# Patient Record
Sex: FEMALE | Race: WHITE | Employment: OTHER | URBAN - METROPOLITAN AREA
[De-identification: names, ages, dates, MRNs, and addresses within clinical notes are randomized per-mention and may not be internally consistent; named-entity substitution may affect disease eponyms.]

---

## 2019-01-29 ENCOUNTER — HOSPITAL ENCOUNTER (OUTPATIENT)
Age: 59
Discharge: HOME | End: 2019-01-29
Payer: COMMERCIAL

## 2019-01-29 ENCOUNTER — HOSPITAL ENCOUNTER (OUTPATIENT)
Dept: WOMENS IMAGING | Age: 59
Discharge: HOME | End: 2019-01-29
Payer: COMMERCIAL

## 2019-01-29 ENCOUNTER — HOSPITAL ENCOUNTER (OUTPATIENT)
Dept: GENERAL RADIOLOGY | Age: 59
Discharge: HOME | End: 2019-01-29
Payer: COMMERCIAL

## 2019-01-29 DIAGNOSIS — Z12.31 VISIT FOR SCREENING MAMMOGRAM: ICD-10-CM

## 2019-01-29 DIAGNOSIS — J44.9 OBSTRUCTIVE CHRONIC BRONCHITIS WITHOUT EXACERBATION (HCC): ICD-10-CM

## 2019-01-29 LAB
BASOPHILS ABSOLUTE: 0 K/CU MM
BASOPHILS RELATIVE PERCENT: 0.4 % (ref 0–1)
DIFFERENTIAL TYPE: ABNORMAL
EOSINOPHILS ABSOLUTE: 0.1 K/CU MM
EOSINOPHILS RELATIVE PERCENT: 1.6 % (ref 0–3)
HCT VFR BLD CALC: 44.7 % (ref 37–47)
HEMOGLOBIN: 14.1 GM/DL (ref 12.5–16)
IMMATURE NEUTROPHIL %: 0.3 % (ref 0–0.43)
LYMPHOCYTES ABSOLUTE: 1.9 K/CU MM
LYMPHOCYTES RELATIVE PERCENT: 24.5 % (ref 24–44)
MCH RBC QN AUTO: 33.3 PG (ref 27–31)
MCHC RBC AUTO-ENTMCNC: 31.5 % (ref 32–36)
MCV RBC AUTO: 105.4 FL (ref 78–100)
MONOCYTES ABSOLUTE: 0.3 K/CU MM
MONOCYTES RELATIVE PERCENT: 3.4 % (ref 0–4)
NUCLEATED RBC %: 0.3 %
PDW BLD-RTO: 13.1 % (ref 11.7–14.9)
PLATELET # BLD: 126 K/CU MM (ref 140–440)
RBC # BLD: 4.24 M/CU MM (ref 4.2–5.4)
REASON FOR REJECTION: NORMAL
REJECTED TEST: NORMAL
SEGMENTED NEUTROPHILS ABSOLUTE COUNT: 5.3 K/CU MM
SEGMENTED NEUTROPHILS RELATIVE PERCENT: 69.8 % (ref 36–66)
SOURCE: NORMAL
TOTAL IMMATURE NEUTOROPHIL: 0.02 K/CU MM
TOTAL NUCLEATED RBC: 0 K/CU MM
WBC # BLD: 7.6 K/CU MM (ref 4–10.5)

## 2019-01-29 PROCEDURE — 85025 COMPLETE CBC W/AUTO DIFF WBC: CPT

## 2019-01-29 PROCEDURE — 71046 X-RAY EXAM CHEST 2 VIEWS: CPT

## 2019-01-29 PROCEDURE — 36415 COLL VENOUS BLD VENIPUNCTURE: CPT

## 2019-01-29 PROCEDURE — 77067 SCR MAMMO BI INCL CAD: CPT

## 2019-01-31 ENCOUNTER — HOSPITAL ENCOUNTER (OUTPATIENT)
Age: 59
Discharge: HOME | End: 2019-01-31
Payer: COMMERCIAL

## 2019-01-31 LAB
ALBUMIN SERPL-MCNC: 4.1 GM/DL (ref 3.4–5)
ALP BLD-CCNC: 67 IU/L (ref 40–128)
ALT SERPL-CCNC: 24 U/L (ref 10–40)
ANION GAP SERPL CALCULATED.3IONS-SCNC: 14 MMOL/L (ref 4–16)
AST SERPL-CCNC: 27 IU/L (ref 15–37)
BACTERIA: ABNORMAL /HPF
BASOPHILS ABSOLUTE: 0 K/CU MM
BASOPHILS RELATIVE PERCENT: 0.5 % (ref 0–1)
BILIRUB SERPL-MCNC: 0.3 MG/DL (ref 0–1)
BILIRUBIN URINE: NEGATIVE MG/DL
BLOOD, URINE: NEGATIVE
BUN BLDV-MCNC: 19 MG/DL (ref 6–23)
CALCIUM SERPL-MCNC: 9 MG/DL (ref 8.3–10.6)
CHLORIDE BLD-SCNC: 96 MMOL/L (ref 99–110)
CHOLESTEROL: 138 MG/DL
CLARITY: ABNORMAL
CO2: 26 MMOL/L (ref 21–32)
COLOR: YELLOW
CREAT SERPL-MCNC: 1 MG/DL (ref 0.6–1.1)
DIFFERENTIAL TYPE: ABNORMAL
EOSINOPHILS ABSOLUTE: 0.4 K/CU MM
EOSINOPHILS RELATIVE PERCENT: 4.9 % (ref 0–3)
GFR AFRICAN AMERICAN: >60 ML/MIN/1.73M2
GFR NON-AFRICAN AMERICAN: 57 ML/MIN/1.73M2
GLUCOSE BLD-MCNC: 169 MG/DL (ref 70–99)
GLUCOSE, URINE: NEGATIVE MG/DL
HCT VFR BLD CALC: 41.1 % (ref 37–47)
HDLC SERPL-MCNC: 76 MG/DL
HEMOGLOBIN: 13.1 GM/DL (ref 12.5–16)
HEPATITIS C ANTIBODY: ABNORMAL
IMMATURE NEUTROPHIL %: 0.2 % (ref 0–0.43)
KETONES, URINE: NEGATIVE MG/DL
LDL CHOLESTEROL DIRECT: 59 MG/DL
LEUKOCYTE ESTERASE, URINE: ABNORMAL
LYMPHOCYTES ABSOLUTE: 3 K/CU MM
LYMPHOCYTES RELATIVE PERCENT: 33.8 % (ref 24–44)
MCH RBC QN AUTO: 32.3 PG (ref 27–31)
MCHC RBC AUTO-ENTMCNC: 31.9 % (ref 32–36)
MCV RBC AUTO: 101.2 FL (ref 78–100)
MONOCYTES ABSOLUTE: 0.6 K/CU MM
MONOCYTES RELATIVE PERCENT: 6.6 % (ref 0–4)
NITRITE URINE, QUANTITATIVE: NEGATIVE
NUCLEATED RBC %: 0 %
PDW BLD-RTO: 12.7 % (ref 11.7–14.9)
PH, URINE: 7 (ref 5–8)
PLATELET # BLD: 199 K/CU MM (ref 140–440)
PMV BLD AUTO: 11.1 FL (ref 7.5–11.1)
POTASSIUM SERPL-SCNC: 4.5 MMOL/L (ref 3.5–5.1)
PROTEIN UA: NEGATIVE MG/DL
RBC # BLD: 4.06 M/CU MM (ref 4.2–5.4)
RBC URINE: 4 /HPF (ref 0–6)
SEGMENTED NEUTROPHILS ABSOLUTE COUNT: 4.8 K/CU MM
SEGMENTED NEUTROPHILS RELATIVE PERCENT: 54 % (ref 36–66)
SODIUM BLD-SCNC: 136 MMOL/L (ref 135–145)
SPECIFIC GRAVITY UA: 1.01 (ref 1–1.03)
SQUAMOUS EPITHELIAL: 8 /HPF
T4 FREE: 1.3 NG/DL (ref 0.9–1.8)
TOTAL IMMATURE NEUTOROPHIL: 0.02 K/CU MM
TOTAL NUCLEATED RBC: 0 K/CU MM
TOTAL PROTEIN: 7.2 GM/DL (ref 6.4–8.2)
TRICHOMONAS: ABNORMAL /HPF
TRIGL SERPL-MCNC: 84 MG/DL
TSH HIGH SENSITIVITY: 3.53 UIU/ML (ref 0.27–4.2)
UROBILINOGEN, URINE: NORMAL MG/DL (ref 0.2–1)
WBC # BLD: 8.8 K/CU MM (ref 4–10.5)
WBC UA: 7 /HPF (ref 0–5)

## 2019-01-31 PROCEDURE — 86803 HEPATITIS C AB TEST: CPT

## 2019-01-31 PROCEDURE — 83721 ASSAY OF BLOOD LIPOPROTEIN: CPT

## 2019-01-31 PROCEDURE — 84443 ASSAY THYROID STIM HORMONE: CPT

## 2019-01-31 PROCEDURE — 85025 COMPLETE CBC W/AUTO DIFF WBC: CPT

## 2019-01-31 PROCEDURE — 81001 URINALYSIS AUTO W/SCOPE: CPT

## 2019-01-31 PROCEDURE — 36415 COLL VENOUS BLD VENIPUNCTURE: CPT

## 2019-01-31 PROCEDURE — 84439 ASSAY OF FREE THYROXINE: CPT

## 2019-01-31 PROCEDURE — 80053 COMPREHEN METABOLIC PANEL: CPT

## 2019-01-31 PROCEDURE — 80061 LIPID PANEL: CPT

## 2019-02-14 ENCOUNTER — HOSPITAL ENCOUNTER (OUTPATIENT)
Age: 59
Discharge: HOME | End: 2019-02-14
Payer: COMMERCIAL

## 2019-02-14 LAB
ESTIMATED AVERAGE GLUCOSE: 103 MG/DL
GLUCOSE FASTING: 86 MG/DL (ref 70–99)
HBA1C MFR BLD: 5.2 % (ref 4.2–6.3)

## 2019-02-14 PROCEDURE — 83036 HEMOGLOBIN GLYCOSYLATED A1C: CPT

## 2019-02-14 PROCEDURE — 36415 COLL VENOUS BLD VENIPUNCTURE: CPT

## 2019-02-14 PROCEDURE — 82947 ASSAY GLUCOSE BLOOD QUANT: CPT

## 2019-03-11 ENCOUNTER — HOSPITAL ENCOUNTER (OUTPATIENT)
Dept: PULMONOLOGY | Age: 59
Discharge: HOME | End: 2019-03-11

## 2019-03-11 NOTE — PROGRESS NOTES
Pt says she is still sick after taking a few days of antibiotics. She said she was not able to give her best effort.   She will reschedule test.

## 2021-11-15 ENCOUNTER — HOSPITAL ENCOUNTER (OUTPATIENT)
Dept: GENERAL RADIOLOGY | Age: 61
Discharge: HOME | End: 2021-11-15
Payer: COMMERCIAL

## 2021-11-15 ENCOUNTER — HOSPITAL ENCOUNTER (OUTPATIENT)
Age: 61
Discharge: HOME | End: 2021-11-15
Payer: COMMERCIAL

## 2021-11-15 DIAGNOSIS — M54.50 LOW BACK PAIN, UNSPECIFIED BACK PAIN LATERALITY, UNSPECIFIED CHRONICITY, UNSPECIFIED WHETHER SCIATICA PRESENT: ICD-10-CM

## 2021-11-15 DIAGNOSIS — M19.90 SENILE ARTHRITIS: ICD-10-CM

## 2021-11-15 DIAGNOSIS — M54.2 NECK PAIN: ICD-10-CM

## 2021-11-15 DIAGNOSIS — M54.6 THORACIC SPINE PAIN: ICD-10-CM

## 2021-11-15 PROCEDURE — 73560 X-RAY EXAM OF KNEE 1 OR 2: CPT

## 2021-11-15 PROCEDURE — 73030 X-RAY EXAM OF SHOULDER: CPT

## 2021-11-15 PROCEDURE — 72100 X-RAY EXAM L-S SPINE 2/3 VWS: CPT

## 2021-11-15 PROCEDURE — 72072 X-RAY EXAM THORAC SPINE 3VWS: CPT

## 2021-11-15 PROCEDURE — 72040 X-RAY EXAM NECK SPINE 2-3 VW: CPT

## 2021-11-15 PROCEDURE — 93005 ELECTROCARDIOGRAM TRACING: CPT | Performed by: FAMILY MEDICINE

## 2021-11-16 ENCOUNTER — HOSPITAL ENCOUNTER (OUTPATIENT)
Age: 61
Discharge: HOME | End: 2021-11-16
Payer: COMMERCIAL

## 2021-11-16 LAB
ALBUMIN SERPL-MCNC: 4.6 GM/DL (ref 3.4–5)
ALP BLD-CCNC: 60 IU/L (ref 40–128)
ALT SERPL-CCNC: 20 U/L (ref 10–40)
ANION GAP SERPL CALCULATED.3IONS-SCNC: 14 MMOL/L (ref 4–16)
AST SERPL-CCNC: 25 IU/L (ref 15–37)
BILIRUB SERPL-MCNC: 0.4 MG/DL (ref 0–1)
BUN BLDV-MCNC: 16 MG/DL (ref 6–23)
CALCIUM SERPL-MCNC: 9.8 MG/DL (ref 8.3–10.6)
CHLORIDE BLD-SCNC: 100 MMOL/L (ref 99–110)
CHOLESTEROL: 152 MG/DL
CO2: 26 MMOL/L (ref 21–32)
CREAT SERPL-MCNC: 1 MG/DL (ref 0.6–1.1)
ERYTHROCYTE SEDIMENTATION RATE: 5 MM/HR (ref 0–30)
ESTIMATED AVERAGE GLUCOSE: 108 MG/DL
GFR AFRICAN AMERICAN: >60 ML/MIN/1.73M2
GFR NON-AFRICAN AMERICAN: 57 ML/MIN/1.73M2
GLUCOSE BLD-MCNC: 71 MG/DL (ref 70–99)
HBA1C MFR BLD: 5.4 % (ref 4.2–6.3)
HCT VFR BLD CALC: 44 % (ref 37–47)
HDLC SERPL-MCNC: 88 MG/DL
HEMOGLOBIN: 14.7 GM/DL (ref 12.5–16)
LDL CHOLESTEROL DIRECT: 55 MG/DL
MAGNESIUM: 2 MG/DL (ref 1.8–2.4)
MCH RBC QN AUTO: 33.2 PG (ref 27–31)
MCHC RBC AUTO-ENTMCNC: 33.4 % (ref 32–36)
MCV RBC AUTO: 99.3 FL (ref 78–100)
PDW BLD-RTO: 12.2 % (ref 11.7–14.9)
PLATELET # BLD: 172 K/CU MM (ref 140–440)
PMV BLD AUTO: 11.9 FL (ref 7.5–11.1)
POTASSIUM SERPL-SCNC: 4.6 MMOL/L (ref 3.5–5.1)
RBC # BLD: 4.43 M/CU MM (ref 4.2–5.4)
SODIUM BLD-SCNC: 140 MMOL/L (ref 135–145)
T4 FREE: 1.34 NG/DL (ref 0.9–1.8)
TOTAL PROTEIN: 7.3 GM/DL (ref 6.4–8.2)
TRIGL SERPL-MCNC: 73 MG/DL
TSH HIGH SENSITIVITY: 3.77 UIU/ML (ref 0.27–4.2)
URIC ACID: 6.5 MG/DL (ref 2.6–6)
VITAMIN D 25-HYDROXY: 49.94 NG/ML
WBC # BLD: 8.4 K/CU MM (ref 4–10.5)

## 2021-11-16 PROCEDURE — 83721 ASSAY OF BLOOD LIPOPROTEIN: CPT

## 2021-11-16 PROCEDURE — 84443 ASSAY THYROID STIM HORMONE: CPT

## 2021-11-16 PROCEDURE — 82306 VITAMIN D 25 HYDROXY: CPT

## 2021-11-16 PROCEDURE — 83735 ASSAY OF MAGNESIUM: CPT

## 2021-11-16 PROCEDURE — 80061 LIPID PANEL: CPT

## 2021-11-16 PROCEDURE — 84439 ASSAY OF FREE THYROXINE: CPT

## 2021-11-16 PROCEDURE — 85027 COMPLETE CBC AUTOMATED: CPT

## 2021-11-16 PROCEDURE — 84550 ASSAY OF BLOOD/URIC ACID: CPT

## 2021-11-16 PROCEDURE — 85652 RBC SED RATE AUTOMATED: CPT

## 2021-11-16 PROCEDURE — 36415 COLL VENOUS BLD VENIPUNCTURE: CPT

## 2021-11-16 PROCEDURE — 80053 COMPREHEN METABOLIC PANEL: CPT

## 2021-11-16 PROCEDURE — 83036 HEMOGLOBIN GLYCOSYLATED A1C: CPT

## 2021-11-17 LAB
EKG ATRIAL RATE: 80 BPM
EKG DIAGNOSIS: NORMAL
EKG P AXIS: 83 DEGREES
EKG P-R INTERVAL: 150 MS
EKG Q-T INTERVAL: 380 MS
EKG QRS DURATION: 90 MS
EKG QTC CALCULATION (BAZETT): 438 MS
EKG R AXIS: 91 DEGREES
EKG T AXIS: 77 DEGREES
EKG VENTRICULAR RATE: 80 BPM

## 2021-11-17 PROCEDURE — 93010 ELECTROCARDIOGRAM REPORT: CPT | Performed by: INTERNAL MEDICINE

## 2022-02-09 ENCOUNTER — HOSPITAL ENCOUNTER (OUTPATIENT)
Dept: WOMENS IMAGING | Age: 62
Discharge: HOME | End: 2022-02-09
Payer: COMMERCIAL

## 2022-02-09 DIAGNOSIS — M81.0 AGE-RELATED OSTEOPOROSIS WITHOUT CURRENT PATHOLOGICAL FRACTURE: ICD-10-CM

## 2022-02-09 DIAGNOSIS — Z12.31 ENCOUNTER FOR SCREENING MAMMOGRAM FOR MALIGNANT NEOPLASM OF BREAST: ICD-10-CM

## 2022-02-09 PROCEDURE — 77080 DXA BONE DENSITY AXIAL: CPT

## 2022-02-09 PROCEDURE — 77063 BREAST TOMOSYNTHESIS BI: CPT

## 2023-07-17 ENCOUNTER — HOSPITAL ENCOUNTER (OUTPATIENT)
Age: 63
Discharge: HOME | End: 2023-07-17
Payer: COMMERCIAL

## 2023-07-17 LAB
25(OH)D3 SERPL-MCNC: 32.45 NG/ML
ALBUMIN SERPL-MCNC: 4.4 GM/DL (ref 3.4–5)
ALP BLD-CCNC: 84 IU/L (ref 40–129)
ALT SERPL-CCNC: 51 U/L (ref 10–40)
ANION GAP SERPL CALCULATED.3IONS-SCNC: 11 MMOL/L (ref 4–16)
AST SERPL-CCNC: 63 IU/L (ref 15–37)
BILIRUB SERPL-MCNC: 0.5 MG/DL (ref 0–1)
BILIRUBIN DIRECT: 0.2 MG/DL (ref 0–0.3)
BILIRUBIN, INDIRECT: 0.3 MG/DL (ref 0–0.7)
BUN SERPL-MCNC: 14 MG/DL (ref 6–23)
CALCIUM SERPL-MCNC: 9.4 MG/DL (ref 8.3–10.6)
CHLORIDE BLD-SCNC: 103 MMOL/L (ref 99–110)
CHOLEST SERPL-MCNC: 185 MG/DL
CO2: 24 MMOL/L (ref 21–32)
CREAT SERPL-MCNC: 1 MG/DL (ref 0.6–1.1)
ERYTHROCYTE SEDIMENTATION RATE: 4 MM/HR (ref 0–30)
ESTIMATED AVERAGE GLUCOSE: 97 MG/DL
GFR SERPL CREATININE-BSD FRML MDRD: >60 ML/MIN/1.73M2
GLUCOSE SERPL-MCNC: 81 MG/DL (ref 70–99)
HBA1C MFR BLD: 5 % (ref 4.2–6.3)
HCT VFR BLD CALC: 45.7 % (ref 37–47)
HDLC SERPL-MCNC: 62 MG/DL
HEMOGLOBIN: 15.1 GM/DL (ref 12.5–16)
LDLC SERPL CALC-MCNC: 101 MG/DL
MAGNESIUM: 2.3 MG/DL (ref 1.8–2.4)
MCH RBC QN AUTO: 32.3 PG (ref 27–31)
MCHC RBC AUTO-ENTMCNC: 33 % (ref 32–36)
MCV RBC AUTO: 97.9 FL (ref 78–100)
PDW BLD-RTO: 13.1 % (ref 11.7–14.9)
PLATELET # BLD: 187 K/CU MM (ref 140–440)
PMV BLD AUTO: 11.4 FL (ref 7.5–11.1)
POTASSIUM SERPL-SCNC: 4.7 MMOL/L (ref 3.5–5.1)
RBC # BLD: 4.67 M/CU MM (ref 4.2–5.4)
SODIUM BLD-SCNC: 138 MMOL/L (ref 135–145)
T4 FREE SERPL-MCNC: 1.14 NG/DL (ref 0.9–1.8)
TOTAL PROTEIN: 6.9 GM/DL (ref 6.4–8.2)
TRIGL SERPL-MCNC: 109 MG/DL
TSH SERPL DL<=0.005 MIU/L-ACNC: 4.56 UIU/ML (ref 0.27–4.2)
URATE SERPL-MCNC: 6.3 MG/DL (ref 2.6–6)
WBC # BLD: 6.4 K/CU MM (ref 4–10.5)

## 2023-07-17 PROCEDURE — 36415 COLL VENOUS BLD VENIPUNCTURE: CPT

## 2023-07-17 PROCEDURE — 82306 VITAMIN D 25 HYDROXY: CPT

## 2023-07-17 PROCEDURE — 84443 ASSAY THYROID STIM HORMONE: CPT

## 2023-07-17 PROCEDURE — 85652 RBC SED RATE AUTOMATED: CPT

## 2023-07-17 PROCEDURE — 80061 LIPID PANEL: CPT

## 2023-07-17 PROCEDURE — 80053 COMPREHEN METABOLIC PANEL: CPT

## 2023-07-17 PROCEDURE — 82248 BILIRUBIN DIRECT: CPT

## 2023-07-17 PROCEDURE — 83036 HEMOGLOBIN GLYCOSYLATED A1C: CPT

## 2023-07-17 PROCEDURE — 84439 ASSAY OF FREE THYROXINE: CPT

## 2023-07-17 PROCEDURE — 83735 ASSAY OF MAGNESIUM: CPT

## 2023-07-17 PROCEDURE — 84550 ASSAY OF BLOOD/URIC ACID: CPT

## 2023-07-17 PROCEDURE — 85027 COMPLETE CBC AUTOMATED: CPT

## 2023-12-29 ENCOUNTER — HOSPITAL ENCOUNTER (EMERGENCY)
Age: 63
Discharge: HOME | End: 2023-12-29
Attending: EMERGENCY MEDICINE
Payer: COMMERCIAL

## 2023-12-29 VITALS
TEMPERATURE: 98.4 F | OXYGEN SATURATION: 97 % | RESPIRATION RATE: 13 BRPM | DIASTOLIC BLOOD PRESSURE: 68 MMHG | SYSTOLIC BLOOD PRESSURE: 106 MMHG | HEART RATE: 60 BPM

## 2023-12-29 DIAGNOSIS — R41.0 DELIRIUM: Primary | ICD-10-CM

## 2023-12-29 LAB
ALBUMIN SERPL-MCNC: 3.6 GM/DL (ref 3.4–5)
ALP BLD-CCNC: 76 IU/L (ref 40–129)
ALT SERPL-CCNC: 20 U/L (ref 10–40)
ANION GAP SERPL CALCULATED.3IONS-SCNC: 7 MMOL/L (ref 7–16)
AST SERPL-CCNC: 22 IU/L (ref 15–37)
BASOPHILS ABSOLUTE: 0 K/CU MM
BASOPHILS RELATIVE PERCENT: 0.7 % (ref 0–1)
BILIRUB SERPL-MCNC: 0.2 MG/DL (ref 0–1)
BUN SERPL-MCNC: 20 MG/DL (ref 6–23)
CALCIUM SERPL-MCNC: 8.3 MG/DL (ref 8.3–10.6)
CHLORIDE BLD-SCNC: 106 MMOL/L (ref 99–110)
CO2: 26 MMOL/L (ref 21–32)
CREAT SERPL-MCNC: 0.9 MG/DL (ref 0.6–1.1)
DIFFERENTIAL TYPE: ABNORMAL
EOSINOPHILS ABSOLUTE: 0.2 K/CU MM
EOSINOPHILS RELATIVE PERCENT: 3 % (ref 0–3)
GFR SERPL CREATININE-BSD FRML MDRD: >60 ML/MIN/1.73M2
GLUCOSE SERPL-MCNC: 103 MG/DL (ref 70–99)
HCT VFR BLD CALC: 35.5 % (ref 37–47)
HEMOGLOBIN: 11.4 GM/DL (ref 12.5–16)
IMMATURE NEUTROPHIL %: 0.2 % (ref 0–0.43)
LYMPHOCYTES ABSOLUTE: 2.9 K/CU MM
LYMPHOCYTES RELATIVE PERCENT: 51.1 % (ref 24–44)
MCH RBC QN AUTO: 32.3 PG (ref 27–31)
MCHC RBC AUTO-ENTMCNC: 32.1 % (ref 32–36)
MCV RBC AUTO: 100.6 FL (ref 78–100)
MONOCYTES ABSOLUTE: 0.3 K/CU MM
MONOCYTES RELATIVE PERCENT: 6 % (ref 0–4)
NUCLEATED RBC %: 0 %
PDW BLD-RTO: 13.9 % (ref 11.7–14.9)
PLATELET # BLD: 141 K/CU MM (ref 140–440)
PMV BLD AUTO: 10.9 FL (ref 7.5–11.1)
POTASSIUM SERPL-SCNC: 4.4 MMOL/L (ref 3.5–5.1)
RBC # BLD: 3.53 M/CU MM (ref 4.2–5.4)
SEGMENTED NEUTROPHILS ABSOLUTE COUNT: 2.2 K/CU MM
SEGMENTED NEUTROPHILS RELATIVE PERCENT: 39 % (ref 36–66)
SODIUM BLD-SCNC: 139 MMOL/L (ref 135–145)
TOTAL IMMATURE NEUTOROPHIL: 0.01 K/CU MM
TOTAL NUCLEATED RBC: 0 K/CU MM
TOTAL PROTEIN: 6 GM/DL (ref 6.4–8.2)
WBC # BLD: 5.7 K/CU MM (ref 4–10.5)

## 2023-12-29 PROCEDURE — 6360000002 HC RX W HCPCS

## 2023-12-29 PROCEDURE — 93005 ELECTROCARDIOGRAM TRACING: CPT | Performed by: EMERGENCY MEDICINE

## 2023-12-29 PROCEDURE — 2580000003 HC RX 258: Performed by: EMERGENCY MEDICINE

## 2023-12-29 PROCEDURE — 99284 EMERGENCY DEPT VISIT MOD MDM: CPT

## 2023-12-29 PROCEDURE — 85025 COMPLETE CBC W/AUTO DIFF WBC: CPT

## 2023-12-29 PROCEDURE — 80053 COMPREHEN METABOLIC PANEL: CPT

## 2023-12-29 RX ORDER — NALOXONE HYDROCHLORIDE 1 MG/ML
INJECTION INTRAMUSCULAR; INTRAVENOUS; SUBCUTANEOUS
Status: COMPLETED
Start: 2023-12-29 | End: 2023-12-29

## 2023-12-29 RX ORDER — 0.9 % SODIUM CHLORIDE 0.9 %
1000 INTRAVENOUS SOLUTION INTRAVENOUS ONCE
Status: COMPLETED | OUTPATIENT
Start: 2023-12-29 | End: 2023-12-29

## 2023-12-29 RX ADMIN — NALOXONE HYDROCHLORIDE: 1 INJECTION PARENTERAL at 11:45

## 2023-12-29 RX ADMIN — SODIUM CHLORIDE 1000 ML: 9 INJECTION, SOLUTION INTRAVENOUS at 12:20

## 2023-12-29 NOTE — ED NOTES
Pt up, outside of her room, asking for discharge paperwork so Daniel Strong can go outside for her ride. \"

## 2023-12-29 NOTE — ED PROVIDER NOTES
Emergency Department Encounter    Patient: Yuridia Curry  MRN: [id number]  : 1960  Date of Evaluation: 2023  ED Provider:  Rohan Mishra MD    Triage Chief Complaint:   Hypotension (70/40 per EMS)    [place]:  Yuridia Curry is a 63 y.o. female that presents for change in mental status and low blood pressure apparently she is at a local homeless shelter she was outside smoking and then people found her not acting normal, EMS had concern for overdose but they did not give her any Narcan    ROS - see HPI, below listed is current ROS at time of my eval:  Will to obtain due to patient's current mental status    Past Medical History:   Diagnosis Date    Anxiety     Depression     Osteopenia     PTSD (post-traumatic stress disorder)      Past Surgical History:   Procedure Laterality Date    TONSILLECTOMY      WRIST GANGLION EXCISION      both wrists     Family History   Problem Relation Age of Onset    Heart Disease Sister      Social History     Socioeconomic History    Marital status:      Spouse name: Not on file    Number of children: Not on file    Years of education: Not on file    Highest education level: Not on file   Occupational History    Not on file   Tobacco Use    Smoking status: Every Day     Current packs/day: 0.50     Average packs/day: 0.5 packs/day for 20.0 years (10.0 ttl pk-yrs)     Types: Cigarettes    Smokeless tobacco: Never   Substance and Sexual Activity    Alcohol use: No    Drug use: Yes     Comment: snorted heroin today    Sexual activity: Yes     Partners: Male   Other Topics Concern    Not on file   Social History Narrative    Not on file     Social Determinants of Health     Financial Resource Strain: Not on file   Food Insecurity: Not on file   Transportation Needs: Not on file   Physical Activity: Not on file   Stress: Not on file   Social Connections: Not on file   Intimate Partner Violence: Not on file   Housing Stability: Not on file     Current Facility-Administered  12/29/2023 01:11:29 PM      Comment: Please note this report has been produced using speech recognition software and may contain errors related to that system including errors in grammar, punctuation, and spelling, as well as words and phrases that may be inappropriate. Efforts were made to edit the dictations.        Maria Dolores Cordero MD  12/29/23 [id number]

## 2023-12-29 NOTE — ED NOTES
Pt arrived unresponsive, only becoming alert when painful stimuli given. Pt hypotensive on arrival, pupils pinpoint, O2 sat 88% on room air, and snoring. Pt given Narcan 2mg IN, more alert. Pt O2 sat 98%. Dr. Jamison Rodriguez notified.

## 2023-12-30 LAB
EKG ATRIAL RATE: 59 BPM
EKG DIAGNOSIS: NORMAL
EKG P AXIS: 76 DEGREES
EKG P-R INTERVAL: 162 MS
EKG Q-T INTERVAL: 452 MS
EKG QRS DURATION: 96 MS
EKG QTC CALCULATION (BAZETT): 447 MS
EKG R AXIS: 78 DEGREES
EKG T AXIS: 66 DEGREES
EKG VENTRICULAR RATE: 59 BPM

## 2023-12-30 PROCEDURE — 93010 ELECTROCARDIOGRAM REPORT: CPT | Performed by: INTERNAL MEDICINE

## 2024-07-28 ENCOUNTER — HOSPITAL ENCOUNTER (EMERGENCY)
Age: 64
Discharge: LEFT BEFORE DISCHARGE | End: 2024-07-28
Payer: COMMERCIAL

## 2024-07-28 VITALS
HEART RATE: 74 BPM | WEIGHT: 130 LBS | HEIGHT: 67 IN | RESPIRATION RATE: 20 BRPM | TEMPERATURE: 98.3 F | OXYGEN SATURATION: 97 % | SYSTOLIC BLOOD PRESSURE: 149 MMHG | DIASTOLIC BLOOD PRESSURE: 85 MMHG | BODY MASS INDEX: 20.4 KG/M2

## 2024-07-28 DIAGNOSIS — T40.1X1A ACCIDENTAL OVERDOSE OF HEROIN, INITIAL ENCOUNTER (HCC): Primary | ICD-10-CM

## 2024-07-28 PROCEDURE — 99283 EMERGENCY DEPT VISIT LOW MDM: CPT

## 2024-07-28 RX ORDER — NALOXONE HYDROCHLORIDE 1 MG/ML
2 INJECTION INTRAMUSCULAR; INTRAVENOUS; SUBCUTANEOUS PRN
Status: DISCONTINUED | OUTPATIENT
Start: 2024-07-28 | End: 2024-07-28 | Stop reason: HOSPADM

## 2024-07-28 RX ORDER — 0.9 % SODIUM CHLORIDE 0.9 %
1000 INTRAVENOUS SOLUTION INTRAVENOUS ONCE
Status: DISCONTINUED | OUTPATIENT
Start: 2024-07-28 | End: 2024-07-28 | Stop reason: HOSPADM

## 2024-07-28 ASSESSMENT — PAIN - FUNCTIONAL ASSESSMENT
PAIN_FUNCTIONAL_ASSESSMENT: NONE - DENIES PAIN
PAIN_FUNCTIONAL_ASSESSMENT: NONE - DENIES PAIN

## 2024-07-28 ASSESSMENT — LIFESTYLE VARIABLES
HOW MANY STANDARD DRINKS CONTAINING ALCOHOL DO YOU HAVE ON A TYPICAL DAY: PATIENT DOES NOT DRINK
HOW OFTEN DO YOU HAVE A DRINK CONTAINING ALCOHOL: NEVER

## 2024-07-28 NOTE — ED TRIAGE NOTES
Patient to the ED via ambulance with a drug overdose. Per medics, the patients friend called 911 stating the patient was dropped off at her house and was overdosing. Patient states a friend gave her heroin and fentanyl that she snorted and smoked. Patient estimates she had a spoonful of heroin. Medics gave the patient 2mg of narcan. Patient is alert and oriented x4, is agitated due to the police questioning her, is not in acute distress, can respond to questions appropriately, has a call light within reach, has been educated on fall risks precautions, has been advised to stay in the bed and keep the monitor on, and denies any needs at this time

## 2024-07-28 NOTE — ED NOTES
Patient is resting in bed, has been evaluated by Sally CANTU, is not in acute distress, and denies any needs at this time

## 2024-07-28 NOTE — ED NOTES
Candi Islas (friend) called to give phone number if patient needs ride home [phone] or [phone].

## 2024-07-28 NOTE — ED NOTES
Patient had a bowel movement on the floor in the doorway of her room and then stated, \"I have to leave to get to my daughters birthday party.\"  Patient walked out without signing an AMA form. Thelma Lee RN and this RN signed the AMA form and placed it in the patients paper chart. Patient walked out of the department without difficulty.

## 2024-07-28 NOTE — ED PROVIDER NOTES
[facility] EMERGENCY DEPARTMENT  EMERGENCY DEPARTMENT ENCOUNTER        Pt Name: Yuridia Curry  MRN: [id number]  Birthdate 1960  Date of evaluation: 7/28/2024  Provider: RACHEL WHELAN - CNP  PCP: Nakia Fitzgerald MD    CHAVA. I have evaluated this patient.        Triage CHIEF COMPLAINT       Chief Complaint   Patient presents with    Drug Overdose     Heroin overdose, 2mg of narcan given by medics         HISTORY OF PRESENT ILLNESS      Chief Complaint: Drug overdose    Yuridia Curry is a 64 y.o. female who presents for evaluation by EMS after being called for overdose of heroin/fentanyl.  Patient admits to smoking heroin and fentanyl at home today.  She was given 2 mg of Narcan and route.  At the time of my evaluation she is drowsy but responsive and agitated.  She refuses to answer questions.  She pulled out her IV and remove the monitors.    Nursing Notes were all reviewed and agreed with or any disagreements were addressed in the HPI.    REVIEW OF SYSTEMS     Pertinent ROS as noted in HPI.      PAST MEDICAL HISTORY     Past Medical History:   Diagnosis Date    Anxiety     Depression     Osteopenia     PTSD (post-traumatic stress disorder)        SURGICAL HISTORY     Past Surgical History:   Procedure Laterality Date    TONSILLECTOMY      WRIST GANGLION EXCISION      both wrists       CURRENTMEDICATIONS       Discharge Medication List as of 7/28/2024  2:56 PM        CONTINUE these medications which have NOT CHANGED    Details   FLUoxetine HCl (PROZAC PO) Take by mouthHistorical Med      ALPRAZolam (XANAX PO) Take by mouthHistorical Med             ALLERGIES     Elavil [amitriptyline]    FAMILYHISTORY       Family History   Problem Relation Age of Onset    Heart Disease Sister         SOCIAL HISTORY       Social History     Socioeconomic History    Marital status:      Spouse name: None    Number of children: None    Years of education: None

## 2024-07-28 NOTE — DISCHARGE INSTR - LAB
[facility] REACH  Outpatient treatment for both men & women  [address]   [facility] [address]  [phone]    [address] [facility] [address]  [phone]    [facility]    [address] [facility] [address]  [phone]  Intensive Outpatient and Residential    [facility]        [address], [address]  [phone]    [facility]  [address], [place] [address]  [phone]    [facility]  [address]   [place], [place] [address]  [phone]    [facility]  [address]  [place], [place] [address]  [phone]    [facility]  [address], [place] [address]  [phone]    [facility]   (various other locations in [place]/ visit [email or web address])  [address] [address]  [phone]    [facility]   [address], ,  [facility], [address] [phone]    [facility]   [address]  [place] [address]  [phone]      [facility]   [address] [place] [address]  [phone]    [facility]  [address] [place] [address]  [phone]    [facility]:   [address] [place] [address]  [phone]    [facility]  [address] [place] [address]  [phone]  [email or web address]    [address], [place] [address] [phone]  Fax [phone]    [address]  [place], [place] [address] [phone]  Fax [phone]    [address] [place] [address]  [phone]  Fax [phone]     [place] [address]  Main number- [phone]  [place]-[phone]    [facility]  [address] [place] [address] [address] [place], [place] [address]  phone: [phone]

## 2024-08-27 ENCOUNTER — APPOINTMENT (OUTPATIENT)
Dept: CT IMAGING | Age: 64
DRG: 133 | End: 2024-08-27
Payer: COMMERCIAL

## 2024-08-27 ENCOUNTER — APPOINTMENT (OUTPATIENT)
Dept: GENERAL RADIOLOGY | Age: 64
DRG: 133 | End: 2024-08-27
Payer: COMMERCIAL

## 2024-08-27 ENCOUNTER — HOSPITAL ENCOUNTER (INPATIENT)
Age: 64
LOS: 1 days | Discharge: HOME | DRG: 133 | End: 2024-08-28
Attending: STUDENT IN AN ORGANIZED HEALTH CARE EDUCATION/TRAINING PROGRAM | Admitting: INTERNAL MEDICINE
Payer: COMMERCIAL

## 2024-08-27 ENCOUNTER — HOSPITAL ENCOUNTER (EMERGENCY)
Age: 64
Discharge: HOME | DRG: 133 | End: 2024-08-27
Attending: STUDENT IN AN ORGANIZED HEALTH CARE EDUCATION/TRAINING PROGRAM
Payer: COMMERCIAL

## 2024-08-27 VITALS
HEART RATE: 59 BPM | SYSTOLIC BLOOD PRESSURE: 119 MMHG | RESPIRATION RATE: 13 BRPM | TEMPERATURE: 98.3 F | OXYGEN SATURATION: 94 % | DIASTOLIC BLOOD PRESSURE: 65 MMHG

## 2024-08-27 DIAGNOSIS — J69.0 ASPIRATION PNEUMONIA OF BOTH LUNGS, UNSPECIFIED ASPIRATION PNEUMONIA TYPE, UNSPECIFIED PART OF LUNG (HCC): ICD-10-CM

## 2024-08-27 DIAGNOSIS — N17.9 AKI (ACUTE KIDNEY INJURY) (HCC): ICD-10-CM

## 2024-08-27 DIAGNOSIS — T40.601A OPIATE OVERDOSE, ACCIDENTAL OR UNINTENTIONAL, INITIAL ENCOUNTER (HCC): Primary | ICD-10-CM

## 2024-08-27 DIAGNOSIS — R79.89 ELEVATED TROPONIN: ICD-10-CM

## 2024-08-27 DIAGNOSIS — J96.01 ACUTE RESPIRATORY FAILURE WITH HYPOXIA (HCC): ICD-10-CM

## 2024-08-27 DIAGNOSIS — G93.40 ACUTE ENCEPHALOPATHY: Primary | ICD-10-CM

## 2024-08-27 PROBLEM — J96.02 ACUTE RESPIRATORY FAILURE WITH HYPOXIA AND HYPERCAPNIA (HCC): Status: ACTIVE | Noted: 2024-08-27

## 2024-08-27 LAB
ALBUMIN SERPL-MCNC: 3.9 GM/DL (ref 3.4–5)
ALCOHOL SCREEN SERUM: <0.01 %WT/VOL
ALP BLD-CCNC: 76 IU/L (ref 40–129)
ALT SERPL-CCNC: 28 U/L (ref 10–40)
AMMONIA: 22 UMOL/L (ref 11–51)
AMPHETAMINES: NEGATIVE
ANION GAP SERPL CALCULATED.3IONS-SCNC: 8 MMOL/L (ref 7–16)
AST SERPL-CCNC: 44 IU/L (ref 15–37)
BACTERIA: ABNORMAL /HPF
BARBITURATE SCREEN URINE: NEGATIVE
BASE EXCESS MIXED: 0.6 (ref 0–3)
BASOPHILS ABSOLUTE: 0 K/CU MM
BASOPHILS RELATIVE PERCENT: 0.4 % (ref 0–1)
BENZODIAZEPINE SCREEN, URINE: NEGATIVE
BILIRUB SERPL-MCNC: 0.5 MG/DL (ref 0–1)
BILIRUBIN DIRECT: 0.2 MG/DL (ref 0–0.3)
BILIRUBIN, INDIRECT: 0.3 MG/DL (ref 0–0.7)
BILIRUBIN, URINE: NEGATIVE MG/DL
BLOOD, URINE: NEGATIVE
BUN SERPL-MCNC: 23 MG/DL (ref 6–23)
CALCIUM SERPL-MCNC: 8.8 MG/DL (ref 8.3–10.6)
CANNABINOID SCREEN URINE: NEGATIVE
CHLORIDE BLD-SCNC: 103 MMOL/L (ref 99–110)
CLARITY, UA: CLEAR
CO2: 29 MMOL/L (ref 21–32)
COCAINE METABOLITE: NEGATIVE
COLOR, UA: YELLOW
COMMENT: ABNORMAL
CREAT SERPL-MCNC: 1.5 MG/DL (ref 0.6–1.1)
DIFFERENTIAL TYPE: ABNORMAL
EKG ATRIAL RATE: 61 BPM
EKG DIAGNOSIS: NORMAL
EKG P AXIS: 68 DEGREES
EKG P-R INTERVAL: 168 MS
EKG Q-T INTERVAL: 466 MS
EKG QRS DURATION: 90 MS
EKG QTC CALCULATION (BAZETT): 469 MS
EKG R AXIS: 74 DEGREES
EKG T AXIS: 71 DEGREES
EKG VENTRICULAR RATE: 61 BPM
EOSINOPHILS ABSOLUTE: 0.3 K/CU MM
EOSINOPHILS RELATIVE PERCENT: 4.8 % (ref 0–3)
FENTANYL URINE: ABNORMAL
GFR, ESTIMATED: 39 ML/MIN/1.73M2
GLUCOSE SERPL-MCNC: 90 MG/DL (ref 70–99)
GLUCOSE URINE: NEGATIVE MG/DL
HCO3 VENOUS: 27.4 MMOL/L (ref 22–29)
HCT VFR BLD CALC: 37.2 % (ref 37–47)
HEMOGLOBIN: 12.5 GM/DL (ref 12.5–16)
IMMATURE NEUTROPHIL %: 0.3 % (ref 0–0.43)
KETONES, URINE: NEGATIVE MG/DL
LEUKOCYTE ESTERASE, URINE: ABNORMAL
LYMPHOCYTES ABSOLUTE: 3.1 K/CU MM
LYMPHOCYTES RELATIVE PERCENT: 44.2 % (ref 24–44)
MCH RBC QN AUTO: 33.2 PG (ref 27–31)
MCHC RBC AUTO-ENTMCNC: 33.6 % (ref 32–36)
MCV RBC AUTO: 98.9 FL (ref 78–100)
MONOCYTES ABSOLUTE: 0.5 K/CU MM
MONOCYTES RELATIVE PERCENT: 7.2 % (ref 0–4)
MUCUS: ABNORMAL HPF
NEUTROPHILS ABSOLUTE: 3 K/CU MM
NEUTROPHILS RELATIVE PERCENT: 43.1 % (ref 36–66)
NITRITE URINE, QUANTITATIVE: NEGATIVE
NUCLEATED RBC %: 0 %
O2 SAT, VEN: 73.4 % (ref 50–70)
OPIATES, URINE: NEGATIVE
OXYCODONE: NEGATIVE
PCO2 VENOUS: 52 MMHG (ref 41–51)
PDW BLD-RTO: 12.2 % (ref 11.7–14.9)
PH VENOUS: 7.33 (ref 7.32–7.43)
PH, URINE: 8 (ref 5–8)
PLATELET # BLD: 142 K/CU MM (ref 140–440)
PMV BLD AUTO: 10.7 FL (ref 7.5–11.1)
PO2 VENOUS: 43 MMHG (ref 28–48)
POTASSIUM SERPL-SCNC: 4.5 MMOL/L (ref 3.5–5.1)
PRO-BNP: 738.6 PG/ML
PROTEIN UA: NEGATIVE MG/DL
RBC # BLD: 3.76 M/CU MM (ref 4.2–5.4)
RBC URINE: 0 /HPF (ref 0–6)
SODIUM BLD-SCNC: 140 MMOL/L (ref 135–145)
SPECIFIC GRAVITY UA: 1.01 (ref 1–1.03)
SQUAMOUS EPITHELIAL: <1 /HPF
TOTAL IMMATURE NEUTOROPHIL: 0.02 K/CU MM
TOTAL NUCLEATED RBC: 0 K/CU MM
TOTAL PROTEIN: 6.9 GM/DL (ref 6.4–8.2)
TRICHOMONAS: ABNORMAL /HPF
TROPONIN, HIGH SENSITIVITY: 10 NG/L (ref 0–14)
TROPONIN, HIGH SENSITIVITY: 15 NG/L (ref 0–14)
UROBILINOGEN, URINE: 1 MG/DL (ref 0.2–1)
WBC # BLD: 6.9 K/CU MM (ref 4–10.5)
WBC UA: 1 /HPF (ref 0–5)

## 2024-08-27 PROCEDURE — 82140 ASSAY OF AMMONIA: CPT

## 2024-08-27 PROCEDURE — 96374 THER/PROPH/DIAG INJ IV PUSH: CPT

## 2024-08-27 PROCEDURE — 99285 EMERGENCY DEPT VISIT HI MDM: CPT

## 2024-08-27 PROCEDURE — 2580000003 HC RX 258

## 2024-08-27 PROCEDURE — 93005 ELECTROCARDIOGRAM TRACING: CPT | Performed by: STUDENT IN AN ORGANIZED HEALTH CARE EDUCATION/TRAINING PROGRAM

## 2024-08-27 PROCEDURE — 6360000002 HC RX W HCPCS

## 2024-08-27 PROCEDURE — 70450 CT HEAD/BRAIN W/O DYE: CPT

## 2024-08-27 PROCEDURE — 99284 EMERGENCY DEPT VISIT MOD MDM: CPT

## 2024-08-27 PROCEDURE — 83880 ASSAY OF NATRIURETIC PEPTIDE: CPT

## 2024-08-27 PROCEDURE — 6360000002 HC RX W HCPCS: Performed by: STUDENT IN AN ORGANIZED HEALTH CARE EDUCATION/TRAINING PROGRAM

## 2024-08-27 PROCEDURE — 82248 BILIRUBIN DIRECT: CPT

## 2024-08-27 PROCEDURE — 85025 COMPLETE CBC W/AUTO DIFF WBC: CPT

## 2024-08-27 PROCEDURE — 82805 BLOOD GASES W/O2 SATURATION: CPT

## 2024-08-27 PROCEDURE — 84484 ASSAY OF TROPONIN QUANT: CPT

## 2024-08-27 PROCEDURE — 71275 CT ANGIOGRAPHY CHEST: CPT

## 2024-08-27 PROCEDURE — 96361 HYDRATE IV INFUSION ADD-ON: CPT

## 2024-08-27 PROCEDURE — 71046 X-RAY EXAM CHEST 2 VIEWS: CPT

## 2024-08-27 PROCEDURE — 1200000000 HC SEMI PRIVATE

## 2024-08-27 PROCEDURE — 2580000003 HC RX 258: Performed by: STUDENT IN AN ORGANIZED HEALTH CARE EDUCATION/TRAINING PROGRAM

## 2024-08-27 PROCEDURE — 80307 DRUG TEST PRSMV CHEM ANLYZR: CPT

## 2024-08-27 PROCEDURE — 72125 CT NECK SPINE W/O DYE: CPT

## 2024-08-27 PROCEDURE — 6360000004 HC RX CONTRAST MEDICATION: Performed by: STUDENT IN AN ORGANIZED HEALTH CARE EDUCATION/TRAINING PROGRAM

## 2024-08-27 PROCEDURE — 81001 URINALYSIS AUTO W/SCOPE: CPT

## 2024-08-27 PROCEDURE — G0480 DRUG TEST DEF 1-7 CLASSES: HCPCS

## 2024-08-27 PROCEDURE — 80053 COMPREHEN METABOLIC PANEL: CPT

## 2024-08-27 PROCEDURE — 93010 ELECTROCARDIOGRAM REPORT: CPT | Performed by: INTERNAL MEDICINE

## 2024-08-27 RX ORDER — IOPAMIDOL 755 MG/ML
75 INJECTION, SOLUTION INTRAVASCULAR
Status: COMPLETED | OUTPATIENT
Start: 2024-08-27 | End: 2024-08-27

## 2024-08-27 RX ORDER — CLONAZEPAM 0.5 MG/1
0.5 TABLET ORAL 2 TIMES DAILY PRN
COMMUNITY

## 2024-08-27 RX ORDER — BUDESONIDE AND FORMOTEROL FUMARATE DIHYDRATE 160; 4.5 UG/1; UG/1
2 AEROSOL RESPIRATORY (INHALATION) 2 TIMES DAILY
COMMUNITY

## 2024-08-27 RX ORDER — METRONIDAZOLE 500 MG/100ML
500 INJECTION, SOLUTION INTRAVENOUS ONCE
Status: COMPLETED | OUTPATIENT
Start: 2024-08-27 | End: 2024-08-28

## 2024-08-27 RX ORDER — NALOXONE HYDROCHLORIDE 1 MG/ML
1 INJECTION INTRAMUSCULAR; INTRAVENOUS; SUBCUTANEOUS ONCE
Status: COMPLETED | OUTPATIENT
Start: 2024-08-27 | End: 2024-08-27

## 2024-08-27 RX ORDER — 0.9 % SODIUM CHLORIDE 0.9 %
1000 INTRAVENOUS SOLUTION INTRAVENOUS ONCE
Status: COMPLETED | OUTPATIENT
Start: 2024-08-27 | End: 2024-08-27

## 2024-08-27 RX ORDER — TOPIRAMATE 25 MG/1
50 TABLET, FILM COATED ORAL 2 TIMES DAILY
COMMUNITY

## 2024-08-27 RX ADMIN — NALOXONE HYDROCHLORIDE 1 MG: 1 INJECTION, SOLUTION INTRAMUSCULAR; INTRAVENOUS; SUBCUTANEOUS at 16:54

## 2024-08-27 RX ADMIN — SODIUM CHLORIDE 1000 ML: 9 INJECTION, SOLUTION INTRAVENOUS at 16:59

## 2024-08-27 RX ADMIN — METRONIDAZOLE 500 MG: 500 INJECTION, SOLUTION INTRAVENOUS at 22:48

## 2024-08-27 RX ADMIN — WATER 1000 MG: 1 INJECTION INTRAMUSCULAR; INTRAVENOUS; SUBCUTANEOUS at 22:43

## 2024-08-27 RX ADMIN — IOPAMIDOL 75 ML: 755 INJECTION, SOLUTION INTRAVENOUS at 21:01

## 2024-08-27 ASSESSMENT — PAIN - FUNCTIONAL ASSESSMENT: PAIN_FUNCTIONAL_ASSESSMENT: NONE - DENIES PAIN

## 2024-08-27 ASSESSMENT — LIFESTYLE VARIABLES
HOW OFTEN DO YOU HAVE A DRINK CONTAINING ALCOHOL: NEVER
HOW OFTEN DO YOU HAVE A DRINK CONTAINING ALCOHOL: NEVER
HOW MANY STANDARD DRINKS CONTAINING ALCOHOL DO YOU HAVE ON A TYPICAL DAY: PATIENT DOES NOT DRINK
HOW MANY STANDARD DRINKS CONTAINING ALCOHOL DO YOU HAVE ON A TYPICAL DAY: PATIENT DOES NOT DRINK

## 2024-08-27 NOTE — ED TRIAGE NOTES
Pt recently discharged from ER after receiving Narcan and was alert and oriented at that time. Friends found her disoriented in the street with blood coming area where her IV had been removed. Pt observed to be \"nodding off\" intermittently.

## 2024-08-27 NOTE — ED PROVIDER NOTES
Emergency Department Encounter    Patient: Yuridia Curry  MRN: [id number]  : 1960  Date of Evaluation: 2024  ED Provider:  Jeffrey Rouse DO    Triage Chief Complaint:   Addiction Problem (Pt stating they \"do not know what they did\". Pt found unresponsive. 4mg narcan given in route. Pt alert on arrival. )    [place]:  Yuridia Curry is a 64 y.o. female that presents after being found unresponsive by neighbors in her yard who called EMS.  On EMS arrival she was unresponsive without spontaneous respirations.  She was given Narcan 4 mg with improvement in her mental status and respirations.  She does not remember much about this morning but tells me that she does think that she snorted heroin.  She has a history of doing so.  Denies any other drug use.  Currently just feels a little sleepy but denies any headache nausea vomiting vision changes chest pain or shortness of breath.  Initially on arrival she was still intermittently sleepy and when she would fall asleep had some desaturation and was placed on supplemental oxygen.  When I evaluated her in the room she was awake and talking and 99% on room air.    MDM:    History from : Patient and EMS    On initial evaluation patient slightly sleepy but able to answer questions and family history.  She does not remarkable what happened but did endorse heroin use.  Likely what was the cause of her episode today.  Vital signs are otherwise stable.  She remained on room air.  Intermittently had some confusion but every time I evaluated her she seemed to be completely alert and oriented.  Given that this was noticed initially by the nurses and that she was fall down the unclear if she had a fall from standing we did obtain CT scans of her head and neck.  No traumatic injuries.  Overall she was monitored here for nearly 3 hours and had good mental status state awake and alert no worsening respiratory symptoms at all.  I do not think further diagnostic necessary and she    Result Value Ref Range    Ventricular Rate 61 BPM    Atrial Rate 61 BPM    P-R Interval 168 ms    QRS Duration 90 ms    Q-T Interval 466 ms    QTc Calculation (Bazett) 469 ms    P Axis 68 degrees    R Axis 74 degrees    T Axis 71 degrees    Diagnosis       Normal sinus rhythm  Normal ECG  When compared with ECG of 29-DEC-2023 11:53,  Nonspecific T wave abnormality now evident in Anterior leads  Confirmed by SARAH ALVAREZ MD ([id number]) on 8/27/2024 2:00:54 PM        Radiographs (if obtained):  Radiologist's Report Reviewed:  No results found.      Clinical Impression:  1. Opiate overdose, accidental or unintentional, initial encounter (HCC)      Disposition referral (if applicable):  Nakia Fitzgerald MD  [address]  [place] [address]  [phone]    Call today  To make an appointment for reevaluation within 1 week    [facility] Emergency Department  [address]  [place] [address]  [phone]  Go today  If symptoms worsen    Disposition medications (if applicable):  New Prescriptions    No medications on file     ED Provider Disposition Time  DISPOSITION Decision To Discharge 08/27/2024 02:13:43 PM  Condition at Disposition: Stable      Comment: Please note this report has been produced using speech recognition software and may contain errors related to that system including errors in grammar, punctuation, and spelling, as well as words and phrases that may be inappropriate.  Efforts were made to edit the dictations.        Jeffrey Rouse DO  08/27/24 1988

## 2024-08-27 NOTE — ED NOTES
Padma DO at bedside with oxygen off pt. SpO2 99% on room air. Pt given warm blanket. Will continue to monitor.

## 2024-08-27 NOTE — ED NOTES
Pt assisted to bedside commode. Pt requiring assistance from RN to stay balanced. Urine sample obtained. Padma Lopez.

## 2024-08-27 NOTE — ED TRIAGE NOTES
Pt arrived to  ED via EMS altered. Pt found unresponsive in front yard. Pt denying drug use. EMS states they gave 4mg narcan in route. Pt alert on arrival stating they \"do not know what they did\".

## 2024-08-27 NOTE — ED NOTES
Pt ambulated to the restroom at this time. Pt felt a little lightheaded at first but otherwise pt tolerated well. Pt assisted back into bed and onto the monitor. Call light in reach.

## 2024-08-27 NOTE — ED PROVIDER NOTES
Emergency Department Encounter    Patient: Yuridia Curry  MRN: [id number]  : 1960  Date of Evaluation: 2024  ED Provider:  Jeffrey Rouse DO    Triage Chief Complaint:   Drug Overdose    [place]:  Yuridia Curry is a 64 y.o. female that presents with concerns for confusion sleepiness and possible LOC.  I evaluated patient earlier due to concerns for heroin overdose earlier she had endorsed snorting heroin.  At time of discharge she was fully awake and was able to self ambulate out of the emergency department.  Apparently her neighbors had witnessed her later acting confused and sleepy.  She denies any recurrent ingestions.  EMS brought her back.  She tells me she feels okay but as she is talking she falls asleep.  She had a normal head CT earlier on presentation.  She denies any chest pain or shortness of breath.    MDM:    History from : Patient    On arrival patient with normal vital signs but later on did become hypoxic.  She initially was given me a full and thorough history but then would fall asleep.  I did give her 1 mg additional Narcan.  When nursing staff reported that after the Narcan she was much more awake but then subsequently will fall asleep again later on her ED course.  She was always arousable.  She did require 2 L nasal cannula oxygen.  She had received 4 mg of Narcan earlier by EMS during her presentation in the morning.  I was concerned for possible pulmonary edema secondary to the Narcan, her EKG looks stable compared to previous.    Some interstitial edema findings on chest x-ray but no other concerning cardiopulmonary abnormalities.  She does have an CHIQUITA and overall does not clinically appear fluid overloaded.  Therefore I did give her IV fluids.  At this point it was unclear what was causing her hypoxia and altered mental status.  I do not think she needs a repeat head CT given normal head CT earlier in the morning.  Will obtain urinalysis and UDS.  Additionally discussed with  rhythm  Normal ECG  When compared with ECG of 27-AUG-2024 11:33,  No significant change was found        Radiographs (if obtained):  Radiologist's Report Reviewed:  XR CHEST (2 VW)    Result Date: 8/27/2024  Chest X-ray INDICATION: Shortness of breath COMPARISON:  None TECHNIQUE: PA and lateral views of the chest were obtained. FINDINGS: The lungs are clear. Mild increased interstitial markings present. There are no infiltrates or effusions.  The heart size is normal.  The bony thorax is intact. Mild to moderate emphysematous change present.     No discrete infiltrate. Subtle interstitial markings that may represent mild interstitial edema. Electronically signed by Connor Root MD    CT CSpine W/O Contrast    Result Date: 8/27/2024  INDICATION: fall. Blunt cervical trauma. PROCEDURE: CT cervical spine. Without contrast. Multiplanar reconstruction performed. Individualized dose optimization was performed with automated exposure control. COMPARISON: MRI cervical spine September 5, 2013 FINDINGS: Cervical spine appears intact. No evidence of fracture. Cervical vertebral body height appears normal. No significant subluxation or spondylolisthesis. Disc height loss and degenerative endplate change and anterior and posterior osteophyte formation at C5-6. Mild multilevel facet degenerative change. Mild degenerative change C1-2. Dens appears intact. Visualized skull base appears intact.     1. Degenerative changes, greatest at C5-6 2. No acute findings 3. No evidence of fracture. Electronically signed by Quinten Naqvi MD    CT Head W/O Contrast    Result Date: 8/27/2024  Head CT INDICATION: Fall. Blunt head trauma TECHNIQUE: Multiple 2D axial images obtained through the brain without intravenous contrast.  Radiation dose reduction techniques were used for this study:  All CT scans performed at this facility use one or all of the following: Automated exposure control, adjustment of the mA and/or kVp according to patient's  size, iterative reconstruction. Procedure CT head without contrast. COMPARISON: April 19, 2017 Findings: There is no CT evidence of intracranial hemorrhage, mass effect, or midline shift. Gray-white differentiation is maintained. Bone window images demonstrate no evidence of fracture. Mild to moderate mucosal thickening left maxillary sinus and left ethmoid air cells. Mild mucosal thickening left sphenoid sinus. No air-fluid levels within the partially visualized paranasal sinuses.     1. Chronic sinusitis, as above 2. No acute intracranial findings Electronically signed by Quinten Naqvi MD        Clinical Impression:  1. Acute encephalopathy    2. Acute respiratory failure with hypoxia (HCC)    3. CHIQUITA (acute kidney injury) (HCC)    4. Elevated troponin      Disposition referral (if applicable):  No follow-up provider specified.  Disposition medications (if applicable):  New Prescriptions    No medications on file     ED Provider Disposition Time  DISPOSITION    Condition at Disposition: Data Unavailable      Comment: Please note this report has been produced using speech recognition software and may contain errors related to that system including errors in grammar, punctuation, and spelling, as well as words and phrases that may be inappropriate.  Efforts were made to edit the dictations.        Jeffrey Rouse DO  08/27/24 2031

## 2024-08-28 VITALS
SYSTOLIC BLOOD PRESSURE: 134 MMHG | WEIGHT: 123.6 LBS | HEART RATE: 91 BPM | DIASTOLIC BLOOD PRESSURE: 74 MMHG | RESPIRATION RATE: 12 BRPM | TEMPERATURE: 97.3 F | HEIGHT: 67 IN | OXYGEN SATURATION: 98 % | BODY MASS INDEX: 19.4 KG/M2

## 2024-08-28 LAB
ANION GAP SERPL CALCULATED.3IONS-SCNC: 9 MMOL/L (ref 7–16)
BASOPHILS ABSOLUTE: 0 K/CU MM
BASOPHILS RELATIVE PERCENT: 0.6 % (ref 0–1)
BUN SERPL-MCNC: 20 MG/DL (ref 6–23)
CALCIUM SERPL-MCNC: 8.5 MG/DL (ref 8.3–10.6)
CHLORIDE BLD-SCNC: 110 MMOL/L (ref 99–110)
CO2: 22 MMOL/L (ref 21–32)
CREAT SERPL-MCNC: 1.2 MG/DL (ref 0.6–1.1)
DIFFERENTIAL TYPE: ABNORMAL
EKG ATRIAL RATE: 66 BPM
EKG DIAGNOSIS: NORMAL
EKG P AXIS: 78 DEGREES
EKG P-R INTERVAL: 162 MS
EKG Q-T INTERVAL: 450 MS
EKG QRS DURATION: 96 MS
EKG QTC CALCULATION (BAZETT): 471 MS
EKG R AXIS: 79 DEGREES
EKG T AXIS: 82 DEGREES
EKG VENTRICULAR RATE: 66 BPM
EOSINOPHILS ABSOLUTE: 0.4 K/CU MM
EOSINOPHILS RELATIVE PERCENT: 8.3 % (ref 0–3)
GFR, ESTIMATED: 51 ML/MIN/1.73M2
GLUCOSE SERPL-MCNC: 70 MG/DL (ref 70–99)
HCT VFR BLD CALC: 41.4 % (ref 37–47)
HEMOGLOBIN: 13.4 GM/DL (ref 12.5–16)
IMMATURE NEUTROPHIL %: 0.2 % (ref 0–0.43)
LYMPHOCYTES ABSOLUTE: 1.9 K/CU MM
LYMPHOCYTES RELATIVE PERCENT: 39.3 % (ref 24–44)
MCH RBC QN AUTO: 33 PG (ref 27–31)
MCHC RBC AUTO-ENTMCNC: 32.4 % (ref 32–36)
MCV RBC AUTO: 102 FL (ref 78–100)
MONOCYTES ABSOLUTE: 0.4 K/CU MM
MONOCYTES RELATIVE PERCENT: 7.7 % (ref 0–4)
NEUTROPHILS ABSOLUTE: 2.1 K/CU MM
NEUTROPHILS RELATIVE PERCENT: 43.9 % (ref 36–66)
NUCLEATED RBC %: 0 %
PDW BLD-RTO: 12.3 % (ref 11.7–14.9)
PLATELET # BLD: 129 K/CU MM (ref 140–440)
PMV BLD AUTO: 10.3 FL (ref 7.5–11.1)
POTASSIUM SERPL-SCNC: 4.5 MMOL/L (ref 3.5–5.1)
RBC # BLD: 4.06 M/CU MM (ref 4.2–5.4)
SARS-COV-2 RDRP RESP QL NAA+PROBE: NOT DETECTED
SODIUM BLD-SCNC: 141 MMOL/L (ref 135–145)
SOURCE: NORMAL
TOTAL IMMATURE NEUTOROPHIL: 0.01 K/CU MM
TOTAL NUCLEATED RBC: 0 K/CU MM
WBC # BLD: 4.8 K/CU MM (ref 4–10.5)

## 2024-08-28 PROCEDURE — 87635 SARS-COV-2 COVID-19 AMP PRB: CPT

## 2024-08-28 PROCEDURE — 6370000000 HC RX 637 (ALT 250 FOR IP): Performed by: INTERNAL MEDICINE

## 2024-08-28 PROCEDURE — 93010 ELECTROCARDIOGRAM REPORT: CPT | Performed by: INTERNAL MEDICINE

## 2024-08-28 PROCEDURE — 94761 N-INVAS EAR/PLS OXIMETRY MLT: CPT

## 2024-08-28 PROCEDURE — 2700000000 HC OXYGEN THERAPY PER DAY

## 2024-08-28 PROCEDURE — 2580000003 HC RX 258: Performed by: INTERNAL MEDICINE

## 2024-08-28 PROCEDURE — 80048 BASIC METABOLIC PNL TOTAL CA: CPT

## 2024-08-28 PROCEDURE — 85025 COMPLETE CBC W/AUTO DIFF WBC: CPT

## 2024-08-28 PROCEDURE — 94640 AIRWAY INHALATION TREATMENT: CPT

## 2024-08-28 PROCEDURE — 36415 COLL VENOUS BLD VENIPUNCTURE: CPT

## 2024-08-28 RX ORDER — BUPRENORPHINE 2 MG/1
4 TABLET SUBLINGUAL PRN
Status: DISCONTINUED | OUTPATIENT
Start: 2024-08-28 | End: 2024-08-28 | Stop reason: HOSPADM

## 2024-08-28 RX ORDER — CLONAZEPAM 0.5 MG/1
0.5 TABLET ORAL 2 TIMES DAILY PRN
Status: DISCONTINUED | OUTPATIENT
Start: 2024-08-28 | End: 2024-08-28 | Stop reason: HOSPADM

## 2024-08-28 RX ORDER — HYDROXYZINE PAMOATE 25 MG/1
50 CAPSULE ORAL EVERY 8 HOURS PRN
Status: DISCONTINUED | OUTPATIENT
Start: 2024-08-28 | End: 2024-08-28 | Stop reason: HOSPADM

## 2024-08-28 RX ORDER — LANOLIN ALCOHOL/MO/W.PET/CERES
3 CREAM (GRAM) TOPICAL NIGHTLY
Status: DISCONTINUED | OUTPATIENT
Start: 2024-08-28 | End: 2024-08-28 | Stop reason: HOSPADM

## 2024-08-28 RX ORDER — ONDANSETRON 2 MG/ML
4 INJECTION INTRAMUSCULAR; INTRAVENOUS EVERY 6 HOURS PRN
Status: DISCONTINUED | OUTPATIENT
Start: 2024-08-28 | End: 2024-08-28 | Stop reason: HOSPADM

## 2024-08-28 RX ORDER — TOPIRAMATE 25 MG/1
50 TABLET, FILM COATED ORAL 2 TIMES DAILY
Status: DISCONTINUED | OUTPATIENT
Start: 2024-08-28 | End: 2024-08-28 | Stop reason: HOSPADM

## 2024-08-28 RX ORDER — ACETAMINOPHEN 650 MG/1
650 SUPPOSITORY RECTAL EVERY 6 HOURS PRN
Status: DISCONTINUED | OUTPATIENT
Start: 2024-08-28 | End: 2024-08-28 | Stop reason: HOSPADM

## 2024-08-28 RX ORDER — GABAPENTIN 300 MG/1
300 CAPSULE ORAL EVERY 8 HOURS PRN
Status: DISCONTINUED | OUTPATIENT
Start: 2024-08-28 | End: 2024-08-28 | Stop reason: HOSPADM

## 2024-08-28 RX ORDER — SODIUM CHLORIDE 0.9 % (FLUSH) 0.9 %
5-40 SYRINGE (ML) INJECTION EVERY 12 HOURS SCHEDULED
Status: DISCONTINUED | OUTPATIENT
Start: 2024-08-28 | End: 2024-08-28 | Stop reason: HOSPADM

## 2024-08-28 RX ORDER — ACETAMINOPHEN 325 MG/1
650 TABLET ORAL EVERY 6 HOURS PRN
Status: DISCONTINUED | OUTPATIENT
Start: 2024-08-28 | End: 2024-08-28 | Stop reason: HOSPADM

## 2024-08-28 RX ORDER — SODIUM CHLORIDE 9 MG/ML
INJECTION, SOLUTION INTRAVENOUS PRN
Status: DISCONTINUED | OUTPATIENT
Start: 2024-08-28 | End: 2024-08-28 | Stop reason: HOSPADM

## 2024-08-28 RX ORDER — METHOCARBAMOL 500 MG/1
750 TABLET, FILM COATED ORAL EVERY 6 HOURS PRN
Status: DISCONTINUED | OUTPATIENT
Start: 2024-08-28 | End: 2024-08-28 | Stop reason: HOSPADM

## 2024-08-28 RX ORDER — ONDANSETRON 4 MG/1
4 TABLET, ORALLY DISINTEGRATING ORAL EVERY 8 HOURS PRN
Status: DISCONTINUED | OUTPATIENT
Start: 2024-08-28 | End: 2024-08-28 | Stop reason: HOSPADM

## 2024-08-28 RX ORDER — DICYCLOMINE HCL 20 MG
20 TABLET ORAL EVERY 6 HOURS PRN
Status: DISCONTINUED | OUTPATIENT
Start: 2024-08-28 | End: 2024-08-28 | Stop reason: HOSPADM

## 2024-08-28 RX ORDER — SODIUM CHLORIDE 0.9 % (FLUSH) 0.9 %
5-40 SYRINGE (ML) INJECTION PRN
Status: DISCONTINUED | OUTPATIENT
Start: 2024-08-28 | End: 2024-08-28 | Stop reason: HOSPADM

## 2024-08-28 RX ORDER — POLYETHYLENE GLYCOL 3350 17 G/17G
17 POWDER, FOR SOLUTION ORAL DAILY PRN
Status: DISCONTINUED | OUTPATIENT
Start: 2024-08-28 | End: 2024-08-28 | Stop reason: HOSPADM

## 2024-08-28 RX ORDER — DOXYCYCLINE HYCLATE 100 MG
100 TABLET ORAL 2 TIMES DAILY
Qty: 10 TABLET | Refills: 0 | Status: SHIPPED | OUTPATIENT
Start: 2024-08-28 | End: 2024-09-02

## 2024-08-28 RX ORDER — IPRATROPIUM BROMIDE AND ALBUTEROL SULFATE 2.5; .5 MG/3ML; MG/3ML
1 SOLUTION RESPIRATORY (INHALATION)
Status: DISCONTINUED | OUTPATIENT
Start: 2024-08-28 | End: 2024-08-28 | Stop reason: HOSPADM

## 2024-08-28 RX ORDER — CLONIDINE HYDROCHLORIDE 0.1 MG/1
0.1 TABLET ORAL EVERY 6 HOURS PRN
Status: DISCONTINUED | OUTPATIENT
Start: 2024-08-28 | End: 2024-08-28 | Stop reason: HOSPADM

## 2024-08-28 RX ORDER — BUDESONIDE AND FORMOTEROL FUMARATE DIHYDRATE 160; 4.5 UG/1; UG/1
2 AEROSOL RESPIRATORY (INHALATION) 2 TIMES DAILY
Status: DISCONTINUED | OUTPATIENT
Start: 2024-08-28 | End: 2024-08-28 | Stop reason: HOSPADM

## 2024-08-28 RX ORDER — ENOXAPARIN SODIUM 100 MG/ML
40 INJECTION SUBCUTANEOUS DAILY
Status: DISCONTINUED | OUTPATIENT
Start: 2024-08-28 | End: 2024-08-28 | Stop reason: HOSPADM

## 2024-08-28 RX ORDER — IBUPROFEN 400 MG/1
400 TABLET, FILM COATED ORAL EVERY 4 HOURS PRN
Status: DISCONTINUED | OUTPATIENT
Start: 2024-08-28 | End: 2024-08-28 | Stop reason: HOSPADM

## 2024-08-28 RX ORDER — LEVOFLOXACIN 500 MG/1
750 TABLET, FILM COATED ORAL EVERY OTHER DAY
Status: DISCONTINUED | OUTPATIENT
Start: 2024-08-28 | End: 2024-08-28 | Stop reason: HOSPADM

## 2024-08-28 RX ORDER — PROMETHAZINE HYDROCHLORIDE 25 MG/1
25 TABLET ORAL EVERY 6 HOURS PRN
Status: DISCONTINUED | OUTPATIENT
Start: 2024-08-28 | End: 2024-08-28 | Stop reason: HOSPADM

## 2024-08-28 RX ORDER — SODIUM CHLORIDE 9 MG/ML
INJECTION, SOLUTION INTRAVENOUS CONTINUOUS
Status: DISCONTINUED | OUTPATIENT
Start: 2024-08-28 | End: 2024-08-28 | Stop reason: HOSPADM

## 2024-08-28 RX ORDER — LOPERAMIDE HCL 2 MG
2 CAPSULE ORAL 4 TIMES DAILY PRN
Status: DISCONTINUED | OUTPATIENT
Start: 2024-08-28 | End: 2024-08-28 | Stop reason: HOSPADM

## 2024-08-28 RX ADMIN — IPRATROPIUM BROMIDE AND ALBUTEROL SULFATE 1 DOSE: .5; 2.5 SOLUTION RESPIRATORY (INHALATION) at 11:49

## 2024-08-28 RX ADMIN — SODIUM CHLORIDE: 9 INJECTION, SOLUTION INTRAVENOUS at 01:46

## 2024-08-28 RX ADMIN — IPRATROPIUM BROMIDE AND ALBUTEROL SULFATE 1 DOSE: .5; 2.5 SOLUTION RESPIRATORY (INHALATION) at 08:52

## 2024-08-28 RX ADMIN — BUDESONIDE AND FORMOTEROL FUMARATE DIHYDRATE 2 PUFF: 160; 4.5 AEROSOL RESPIRATORY (INHALATION) at 08:53

## 2024-08-28 ASSESSMENT — PAIN SCALES - GENERAL: PAINLEVEL_OUTOF10: 0

## 2024-08-28 NOTE — H&P
History and Physical      Name:  Yuridia Curry /Age/Sex: 1960  (64 y.o. female)   MRN & CSN:  [id number] & [id number] Encounter Date/Time: 2024 10:40 PM EDT   Location:  ED15/ED-15 PCP: Nakia Fitzgerald MD       Hospital Day: 1    Assessment and Plan:     #.  Acute respiratory failure with hypoxia and hypercapnia  -VBG-pH 7.33, pCO2 52, pO2 43, HCO3 27.4  -CTA chest-no PE, moderate upper lobe predominant emphysema, multifocal groundglass patches of bilateral bilateral lower concerning for year.  -Patient was saturating 82-86% on room air on arrival  -Saturating 94-97% on 2 L of oxygen through nasal cannula.  Patient is not on home oxygen.  -proBNP 738.6  -Monitor and wean off oxygen as tolerated.    #.  Pneumonia  -CTA chest concerning for pneumonia    #.  CHIQUITA  -BUNs/creatinine 23/1.5, was 20/0.9-2023  -Continue IV fluids and repeat BMP    #.  Acute toxic encephalopathy-resolved  -Suspected secondary to drug use  -Patient appears lethargic, but able to answer questions appropriately.  -Ammonia 22  -UA not suggestive of infection.  -Urine drug screen positive for fentanyl.    #.  Opioid abuse  -COWS protocol ordered    #.  Chronic hepatitis C    #.  COPD, not on home oxygen  -Does not appear to be in exacerbation  -Patient is on Symbicort  -Continue DuoNeb every 4 hours while awake.    #.  Chronic tobacco dependence  -Admits to 2 PPD    #.  Anxiety disorder-on clonazepam    #.  Headache -patient was started on Topamax by PCP    #.  Depression/anxiety-on fluoxetine    Disposition:   Current Living situation: Home by self    Diet Regular   DVT Prophylaxis [x] Lovenox, []  Heparin, [] SCDs, [] Ambulation,  [] Eliquis, [] Xarelto   Code Status Full-discussed with patient   Surrogate Decision Maker/ POA Patient does not have a decision maker at this time.     History from:   EMR, patient.    History of Present Illness:     Chief Complaint: Acute respiratory failure with hypoxia and hypercapnia  mild pneumonia. Electronically signed by Sudheer Rasheed    XR CHEST (2 VW)    Result Date: 8/27/2024  Chest X-ray INDICATION: Shortness of breath COMPARISON:  None TECHNIQUE: PA and lateral views of the chest were obtained. FINDINGS: The lungs are clear. Mild increased interstitial markings present. There are no infiltrates or effusions.  The heart size is normal.  The bony thorax is intact. Mild to moderate emphysematous change present.     No discrete infiltrate. Subtle interstitial markings that may represent mild interstitial edema. Electronically signed by Connor Root MD    CT CSpine W/O Contrast    Result Date: 8/27/2024  INDICATION: fall. Blunt cervical trauma. PROCEDURE: CT cervical spine. Without contrast. Multiplanar reconstruction performed. Individualized dose optimization was performed with automated exposure control. COMPARISON: MRI cervical spine September 5, 2013 FINDINGS: Cervical spine appears intact. No evidence of fracture. Cervical vertebral body height appears normal. No significant subluxation or spondylolisthesis. Disc height loss and degenerative endplate change and anterior and posterior osteophyte formation at C5-6. Mild multilevel facet degenerative change. Mild degenerative change C1-2. Dens appears intact. Visualized skull base appears intact.     1. Degenerative changes, greatest at C5-6 2. No acute findings 3. No evidence of fracture. Electronically signed by Quinten Naqvi MD    CT Head W/O Contrast    Result Date: 8/27/2024  Head CT INDICATION: Fall. Blunt head trauma TECHNIQUE: Multiple 2D axial images obtained through the brain without intravenous contrast.  Radiation dose reduction techniques were used for this study:  All CT scans performed at this facility use one or all of the following: Automated exposure control, adjustment of the mA and/or kVp according to patient's size, iterative reconstruction. Procedure CT head without contrast. COMPARISON: April 19, 2017 Findings:  There is no CT evidence of intracranial hemorrhage, mass effect, or midline shift. Gray-white differentiation is maintained. Bone window images demonstrate no evidence of fracture. Mild to moderate mucosal thickening left maxillary sinus and left ethmoid air cells. Mild mucosal thickening left sphenoid sinus. No air-fluid levels within the partially visualized paranasal sinuses.     1. Chronic sinusitis, as above 2. No acute intracranial findings Electronically signed by Quinten Naqvi MD      Personally reviewed Lab Studies, Imaging, and discussed case with ED physician.    Electronically signed by Radha Simon MD on 8/27/2024 at 10:40 PM    Comment: Please note this report has been produced using speech recognition software and may contain errors related to that system including errors in grammar, punctuation, and spelling, as well as words and phrases that may be inappropriate. If there are any questions or concerns please feel free to contact the dictating provider for clarification.

## 2024-08-28 NOTE — ED NOTES
Returned from CT scan.   IV infiltrated in CT, CT tech attempted x 2 to initiate but unsuccessful.   IV restarted, CT tech notified that IV is now patent and ready for scan.   Troponin and BNP redrawn, labeled and walked to lab.   VBG also drawn, labeled, RT notified    Undressed, monitoring continued, pure wick applied.

## 2024-08-28 NOTE — ED NOTES
Resting in bed with eyes closed, awakens immediately upon speaking name.   NAD noted.   Skin w/d oral mucosa moist pink lips nailbeds pink brisk crf, resp easy unlabored with symmetrical rise and fall of chest wall.     CM SB without ectopy.   Call light within reach, bed in low position,   Declines needs currently.

## 2024-08-28 NOTE — ED PROVIDER NOTES
Emergency Department Encounter  Location: [facility] EMERGENCY DEPARTMENT    Patient: Yuridia Curry  MRN: [id number]  : 1960  Date of evaluation: 2024  ED Provider: Rambo Shaffer DO    Yuridia Curry was checked out to me by Dr. Rouse. Please see his/her initial documentation for details of the patient's initial ED presentation, physical exam and completed studies.    In brief, Yuridia Curry is a 64 y.o. female that presented to the emergency department today after being found by neighbors.  She now has some confusion and is requiring oxygen.  Patient pending CTA prior to admission.      I have reviewed and interpreted all of the currently available lab results and diagnostics from this visit:  Results for orders placed or performed during the hospital encounter of 24   BMP   Result Value Ref Range    Sodium 140 135 - 145 MMOL/L    Potassium 4.5 3.5 - 5.1 MMOL/L    Chloride 103 99 - 110 mMol/L    CO2 29 21 - 32 MMOL/L    Anion Gap 8 7 - 16    Glucose 90 70 - 99 MG/DL    BUN 23 6 - 23 MG/DL    Creatinine 1.5 (H) 0.6 - 1.1 MG/DL    Est, Glom Filt Rate 39 (L) >60 mL/min/1.73m2    Calcium 8.8 8.3 - 10.6 MG/DL   CBC with Auto Differential   Result Value Ref Range    WBC 6.9 4.0 - 10.5 K/CU MM    RBC 3.76 (L) 4.2 - 5.4 M/CU MM    Hemoglobin 12.5 12.5 - 16.0 GM/DL    Hematocrit 37.2 37 - 47 %    MCV 98.9 78 - 100 FL    MCH 33.2 (H) 27 - 31 PG    MCHC 33.6 32.0 - 36.0 %    RDW 12.2 11.7 - 14.9 %    Platelets 142 140 - 440 K/CU MM    MPV 10.7 7.5 - 11.1 FL    Differential Type AUTOMATED DIFFERENTIAL     Neutrophils % 43.1 36 - 66 %    Lymphocytes % 44.2 (H) 24 - 44 %    Monocytes % 7.2 (H) 0 - 4 %    Eosinophils % 4.8 (H) 0 - 3 %    Basophils % 0.4 0 - 1 %    Neutrophils Absolute 3.0 K/CU MM    Lymphocytes Absolute 3.1 K/CU MM    Monocytes Absolute 0.5 K/CU MM    Eosinophils Absolute 0.3 K/CU MM    Basophils Absolute 0.0 K/CU MM    Nucleated RBC % 0.0 %    Total  Contrast    Result Date: 8/27/2024  INDICATION: fall. Blunt cervical trauma. PROCEDURE: CT cervical spine. Without contrast. Multiplanar reconstruction performed. Individualized dose optimization was performed with automated exposure control. COMPARISON: MRI cervical spine September 5, 2013 FINDINGS: Cervical spine appears intact. No evidence of fracture. Cervical vertebral body height appears normal. No significant subluxation or spondylolisthesis. Disc height loss and degenerative endplate change and anterior and posterior osteophyte formation at C5-6. Mild multilevel facet degenerative change. Mild degenerative change C1-2. Dens appears intact. Visualized skull base appears intact.     1. Degenerative changes, greatest at C5-6 2. No acute findings 3. No evidence of fracture. Electronically signed by Quinten Naqvi MD    CT Head W/O Contrast    Result Date: 8/27/2024  Head CT INDICATION: Fall. Blunt head trauma TECHNIQUE: Multiple 2D axial images obtained through the brain without intravenous contrast.  Radiation dose reduction techniques were used for this study:  All CT scans performed at this facility use one or all of the following: Automated exposure control, adjustment of the mA and/or kVp according to patient's size, iterative reconstruction. Procedure CT head without contrast. COMPARISON: April 19, 2017 Findings: There is no CT evidence of intracranial hemorrhage, mass effect, or midline shift. Gray-white differentiation is maintained. Bone window images demonstrate no evidence of fracture. Mild to moderate mucosal thickening left maxillary sinus and left ethmoid air cells. Mild mucosal thickening left sphenoid sinus. No air-fluid levels within the partially visualized paranasal sinuses.     1. Chronic sinusitis, as above 2. No acute intracranial findings Electronically signed by Quinten Naqvi MD     Final ED Course and MDM:  CTA showed: Pneumonia.  Started on IV ceftriaxone and metronidazole for possible  aspiration pneumonia, given recent events.  She remained slow to answer questions but answering questions appropriately.  Discussed with hospitalist who will admit patient.    ED Medication Orders (From admission, onward)      Start Ordered     Status Ordering Provider    08/27/24 2230 08/27/24 2219  cefTRIAXone (ROCEPHIN) 1,000 mg in sterile water 10 mL IV syringe  ONCE        Question Answer Comment   Antimicrobial Indications Pneumonia (Nosocomial)    Pnuemonia (Nosocomial) duration of therapy 7 days        Ordered ANN ALANIZ    08/27/24 2230 08/27/24 2219  metroNIDAZOLE (FLAGYL) 500 mg in 0.9% NaCl 100 mL IVPB premix  ONCE        Question Answer Comment   Antimicrobial Indications Pneumonia (Nosocomial)    Pnuemonia (Nosocomial) duration of therapy 7 days        Ordered ANN ALANIZ    08/27/24 2100 08/27/24 2101  iopamidol (ISOVUE-370) 76 % injection 75 mL  IMG ONCE PRN         Last MAR action: Given - by OSMIN ALEXANDER on 08/27/24 at 21[facility]    08/27/24 1645 08/27/24 1639  sodium chloride 0.9 % bolus 1,000 mL  ONCE         Last MAR action: Stopped - by GANESH MAGAÑA on 08/27/24 at 2014 [facility]    08/27/24 1645 08/27/24 1639  naloxone (NARCAN) injection 1 mg  ONCE         Last MAR action: Given - by BRAEDEN MOBLEY on 08/27/24 at 1654 [facility]            Final Impression      1. Acute encephalopathy    2. Acute respiratory failure with hypoxia (HCC)    3. CHIQUITA (acute kidney injury) (HCC)    4. Elevated troponin    5. Aspiration pneumonia of both lungs, unspecified aspiration pneumonia type, unspecified part of lung (HCC)        DISPOSITION Decision To Admit 08/27/2024 10:24:30 PM  Condition at Disposition: Fair     (Please note that portions of this note may have been completed with a voice recognition program. Efforts were made to edit the dictations but occasionally words are mis-transcribed.)    Ann Alaniz, DO  [facility] [employer]       Ann Alaniz,

## 2024-08-28 NOTE — ED NOTES
Sleeping soundly but does awaken when name called, falls back to sleep quickly if not stimulated to speak.   Resp shallow, rate 12, SPO2 96% on 2lpm/NC     Dr. Rouse notified of decreased LOC that has continued but maintaining airway and saturation with 2lpm/NC    Bed in low position, side rails up x 2 call light at bedside.

## 2024-08-28 NOTE — PLAN OF CARE
Problem: Discharge Planning  Goal: Discharge to home or other facility with appropriate resources  8/28/2024 1224 by Reji Romano, RN  Outcome: Progressing  8/28/2024 0321 by Shonda Linares RN  Outcome: Progressing     Problem: Pain  Goal: Verbalizes/displays adequate comfort level or baseline comfort level  Outcome: Progressing     Problem: Safety - Adult  Goal: Free from fall injury  8/28/2024 1224 by Reji Romano, RN  Outcome: Progressing  8/28/2024 0321 by Shonda Linares RN  Outcome: Progressing

## 2024-08-28 NOTE — ED NOTES
ED TO INPATIENT SBAR HANDOFF    Patient Name: Yuridia Curry   :  1960  64 y.o.   Preferred Name  yuridia   Family/Caregiver Present yes   Restraints no   C-SSRS:    Sitter no   Sepsis Risk Score        Situation  Chief Complaint   Patient presents with    Drug Overdose     Brief Description of Patient's Condition:  64 y.o. female that presents with concerns for confusion sleepiness and possible LOC.  I evaluated patient earlier due to concerns for heroin overdose earlier she had endorsed snorting heroin.  At time of discharge she was fully awake and was able to self ambulate out of the emergency department.  Apparently her neighbors had witnessed her later acting confused and sleepy.  She denies any recurrent ingestions.  EMS brought her back.  She tells me she feels okay but as she is talking she falls asleep.  She had a normal head CT earlier on presentation.  She denies any chest pain or shortness of breath.   Mental Status: oriented, drowsy.   Arrived from: home    Imaging:   CTA CHEST W CONTRAST   Final Result   1.  No pulmonary embolus.   2.  Moderate upper lobe predominant emphysema   3.  Multifocal groundglass patches of bilateral bronchial wall thickening,   concerning for mild pneumonia.            Electronically signed by Sudheer Rasheed      XR CHEST (2 VW)   Final Result   No discrete infiltrate. Subtle interstitial markings that may   represent mild interstitial edema.         Electronically signed by Connor Root MD        Abnormal labs:   Abnormal Labs Reviewed   BASIC METABOLIC PANEL - Abnormal; Notable for the following components:       Result Value    Creatinine 1.5 (*)     Est, Glom Filt Rate 39 (*)     All other components within normal limits   CBC WITH AUTO DIFFERENTIAL - Abnormal; Notable for the following components:    RBC 3.76 (*)     MCH 33.2 (*)     Lymphocytes % 44.2 (*)     Monocytes % 7.2 (*)     Eosinophils % 4.8 (*)     All other components within normal limits   TROPONIN  - Abnormal; Notable for the following components:    Troponin, High Sensitivity 15 (*)     All other components within normal limits   HEPATIC FUNCTION PANEL - Abnormal; Notable for the following components:    AST 44 (*)     All other components within normal limits   BRAIN NATRIURETIC PEPTIDE - Abnormal; Notable for the following components:    Pro-.6 (*)     All other components within normal limits   BLOOD GAS, VENOUS - Abnormal; Notable for the following components:    pCO2, [place] 52 (*)     O2 Sat, [place] 73.4 (*)     All other components within normal limits        Background  History:   Past Medical History:   Diagnosis Date    Anxiety     Depression     Osteopenia     PTSD (post-traumatic stress disorder)        Assessment    Vitals:    Level of Consciousness: Alert (0)   Vitals:    08/27/24 2132 08/27/24 2202 08/27/24 2233 08/27/24 2302   BP: (!) 146/71 121/77 (!) 141/72 (!) 140/71   Pulse: 57 58 59 58   Resp: 12 (!) 8 10 10   Temp:       TempSrc:       SpO2: 95% 96% 94% 94%     PO Status: Nothing by Mouth  O2 Flow Rate: O2 Device: Nasal cannula O2 Flow Rate (L/min): 2 L/min  Cardiac Rhythm: SB  Last documented pain medication administered: none   NIH Score: NIH     Active LDA's:   Peripheral IV 08/27/24 Right Antecubital (Active)   Site Assessment Clean, dry & intact 08/27/24 2006   Line Status Blood return noted 08/27/24 2006   Phlebitis Assessment No symptoms 08/27/24 2006   Infiltration Assessment 0 08/27/24 2006   Alcohol Cap Used No 08/27/24 2006   Dressing Status New dressing applied;Clean, dry & intact 08/27/24 2006   Dressing Type Transparent 08/27/24 2006   Dressing Intervention New 08/27/24 2006       Pertinent or High Risk Medications/Drips: no   If Yes, please provide details: na   Blood Product Administration: no  If Yes, please provide details: na    Recommendation    Incomplete orders na   Additional Comments: na    If any further questions, please call Sending RN at  [phone]     Electronically signed by: Electronically signed by Valeria Wakefield RN on 8/27/2024 at 11:28 PM

## 2024-08-28 NOTE — PLAN OF CARE
Problem: Discharge Planning  Goal: Discharge to home or other facility with appropriate resources  8/28/2024 1301 by Reji Romano RN  Outcome: Progressing  8/28/2024 1224 by Reji Romano RN  Outcome: Progressing  8/28/2024 0321 by Shonda Linares RN  Outcome: Progressing     Problem: Pain  Goal: Verbalizes/displays adequate comfort level or baseline comfort level  8/28/2024 1301 by Reji Romano RN  Outcome: Progressing  8/28/2024 1224 by Reji Romano RN  Outcome: Progressing     Problem: Safety - Adult  Goal: Free from fall injury  8/28/2024 1301 by Reji Romano RN  Outcome: Progressing  8/28/2024 1224 by Reji Romano RN  Outcome: Progressing  8/28/2024 0321 by Shonda Linares RN  Outcome: Progressing

## 2024-08-28 NOTE — DISCHARGE SUMMARY
V2.0  Discharge Summary    Name:  Yuridia Curry /Age/Sex: 1960 (64 y.o. female)   Admit Date: 2024  Discharge Date: 24    MRN & CSN:  [id number] & [id number] Encounter Date and Time 24 1:37 PM EDT    Attending:  Lety Hicks MD Discharging Provider: Lety Hicks MD       Hospital Course:     Brief HPI: Yuridia Curry is a 64 y.o. female with COPD, anxiety disorder, chronic hepatitis C, chronic tobacco dependence was brought to ED for confusion.  Patient was seen in ER earlier after she was found unresponsive.  Patient received Narcan by EMS.  Patient was observed in ER for 3 hours and discharged home.  There was a concern for a fall and CT head, CT C-spine were done which were negative for any acute process.  Patient was brought back to ER later in the evening for confusion, lethargy.  And possible loss of consciousness.  Patient is currently awake, alert, oriented x 2-3.  Knew she was in the hospital, name of the hospital, knew the year, could not recall date or month.  Was able to recall her home medications.  Currently denied any headache, visual disturbance, nausea, vomiting, chest pain, shortness of breath, denying any abdominal pain, denied any urinary complaints, denied any constipation or diarrhea.  Patient stated that she snorted fentanyl.     Brief Problem Based Course:     #.  Acute respiratory failure with hypoxia and hypercapnia  -VBG-pH 7.33, pCO2 52, pO2 43, HCO3 27.4  -CTA chest-no PE, moderate upper lobe predominant emphysema, multifocal groundglass patches of bilateral bronchial wall thickening  -Patient was saturating 82-86% on room air on arrival  -Saturating 94-97% on 2 L of oxygen through nasal cannula.  Weaned down to room air prior to discharge  -P.o. doxycycline on discharge       #  Pneumonia  -CTA chest concerning for pneumonia     #.  CHIQUITA  -BUNs/creatinine 23/1.5, was 20/0.9-2023  -Improving with IV fluid hydration.     #.  Acute toxic

## 2024-08-28 NOTE — PROGRESS NOTES
4 Eyes Skin Assessment     NAME:  Yuridia Curry  YOB: 1960  MEDICAL RECORD NUMBER:  [id number]    The patient is being assessed for  Admission    I agree that at least one RN has performed a thorough Head to Toe Skin Assessment on the patient. ALL assessment sites listed below have been assessed.      Areas assessed by both nurses:    Head, Face, Ears, Shoulders, Back, Chest, Arms, Elbows, Hands, Sacrum. Buttock, Coccyx, Ischium, Legs. Feet and Heels, and Under Medical Devices         Does the Patient have a Wound? No noted wound(s) A small bruise noted to left knee.       [place]incoln Prevention initiated by RN: Yes  Wound Care Orders initiated by RN: No    Pressure Injury (Stage 3,4, Unstageable, DTI, NWPT, and Complex wounds) if present, place Wound referral order by RN under : No    New Ostomies, if present place, Ostomy referral order under : No     Nurse 1 eSignature: Electronically signed by Shonda Linares RN on 8/28/24 at 1:49 AM EDT    **SHARE this note so that the co-signing nurse can place an eSignature**    Nurse 2 eSignature: Electronically signed by Nicol Hernandez RN on 8/28/24 at 4:05 AM EDT

## 2024-08-28 NOTE — ED NOTES
Resting in bed with eyes closed, awakens with name spoken but falls to sleep quickly   NAD noted.   Skin w/d oral mucosa moist pink lips nailbeds pink brisk crf, resp easy unlabored with symmetrical rise and fall of chest wall.     CM SB without ectopy.   Call light within reach, bed in low position,   Declines needs currently.     Denies pain at present.

## 2024-09-12 NOTE — PROGRESS NOTES
Physician Progress Note      PATIENT:               MANNY MONTESINOS  CSN #:                  [id number]  :                       1960  ADMIT DATE:       2024 4:15 PM  DISCH DATE:        2024 2:29 PM  RESPONDING  PROVIDER #:        Lety Hicks MD          QUERY TEXT:    Pt admitted with acute respiratory failure. Pt noted to have toxic   encephalopathy and was seen in the ED the morning of this admission for a drug   overdose. If possible, please document in progress notes and discharge   summary the relationship, if any, between toxic encephalopathy and a possible   drug overdose.    The medical record reflects the following:  Risk Factors: was in ED earlier the day of admit for a drug overdose, UDS   positive for Fentanyl    Clinical Indicators: Per  ED notes- \"I evaluated patient earlier due to   concerns for heroin overdose earlier she had endorsed snorting heroin.  At   time of discharge she was fully awake and was able to self ambulate out of the   emergency department.  Apparently her neighbors had witnessed her later   acting confused and sleepy.  She denies any recurrent ingestions.  EMS brought   her back.  She tells me she feels okay but as she is talking she falls   asleep. I did give her 1 mg additional Narcan. Pt snorted heroin today and   fentanyl.\"  Per  DCS- Acute toxic encephalopathy-resolved  -Suspected secondary to drug use  -Patient appears lethargic, but able to answer questions appropriately.  -Ammonia 22  -UA not suggestive of infection.  -Urine drug screen positive for fentanyl.    Treatment: UDS, Narcan X 1 in ED  Options provided:  -- Toxic encephalopathy due to possible drug overdose  -- Toxic encephalopathy unrelated to drug overdose  -- Other - I will add my own diagnosis  -- Disagree - Not applicable / Not valid  -- Disagree - Clinically unable to determine / Unknown  -- Refer to Clinical Documentation Reviewer    PROVIDER RESPONSE TEXT:    Toxic encephalopathy